# Patient Record
Sex: MALE | Race: BLACK OR AFRICAN AMERICAN
[De-identification: names, ages, dates, MRNs, and addresses within clinical notes are randomized per-mention and may not be internally consistent; named-entity substitution may affect disease eponyms.]

---

## 2019-08-12 ENCOUNTER — HOSPITAL ENCOUNTER (EMERGENCY)
Dept: HOSPITAL 10 - FTE | Age: 44
Discharge: HOME | End: 2019-08-12
Payer: COMMERCIAL

## 2019-08-12 ENCOUNTER — HOSPITAL ENCOUNTER (EMERGENCY)
Dept: HOSPITAL 91 - FTE | Age: 44
Discharge: HOME | End: 2019-08-12
Payer: COMMERCIAL

## 2019-08-12 VITALS
WEIGHT: 219.36 LBS | HEIGHT: 72 IN | HEIGHT: 72 IN | BODY MASS INDEX: 29.71 KG/M2 | WEIGHT: 219.36 LBS | BODY MASS INDEX: 29.71 KG/M2

## 2019-08-12 VITALS — DIASTOLIC BLOOD PRESSURE: 90 MMHG | RESPIRATION RATE: 16 BRPM | HEART RATE: 98 BPM | SYSTOLIC BLOOD PRESSURE: 145 MMHG

## 2019-08-12 DIAGNOSIS — E11.9: ICD-10-CM

## 2019-08-12 DIAGNOSIS — L02.31: Primary | ICD-10-CM

## 2019-08-12 DIAGNOSIS — F17.210: ICD-10-CM

## 2019-08-12 PROCEDURE — 99283 EMERGENCY DEPT VISIT LOW MDM: CPT

## 2019-08-12 RX ADMIN — CEPHALEXIN 1 MG: 500 CAPSULE ORAL at 03:43

## 2019-08-12 RX ADMIN — SULFAMETHOXAZOLE AND TRIMETHOPRIM 1 TAB: 800; 160 TABLET ORAL at 03:43

## 2019-08-12 NOTE — ERD
ER Documentation


Chief Complaint


Chief Complaint





ABSCESS ON BUTTOCK X2DAYS; HX OF DM





HPI


Patient is a 44-year-old male past medical history of DM type II, who presents 


the ER for concerns of an abscess to his buttocks.  Patient states for the last 


2 days he has noted drainage from his buttocks.  Patient states he noticed the 


drainage [place] the [place].  Patient denies any IV drug use.  Patient 


denies any fevers or chills.  Patient states he takes metformin for his diabetes


daily.  Patient stated his tetanus vaccination is up-to-date he got it 2 years 


ago.





ROS


All systems reviewed and are negative except as per history of present illness.





Medications


Home Meds


Active Scripts


Acetaminophen* (Tylophen*) 500 Mg Capsule, 1 CAP PO Q6H PRN for PAIN AND OR 


ELEVATED TEMP, #20 CAP


   Prov:DAKOTA GONZALEZ PA-C         8/12/19


Sulfamethoxazole/Trimethoprim* (Bactrim Ds* Tablet) 1 Each Tablet, 1 TAB PO BID,


#14 TAB


   Prov:DAKOTA GONZALEZ PA-C         8/12/19


Cephalexin* (Keflex*) 500 Mg Capsule, 500 MG PO TID for 7 Days, CAP


   Prov:DAKOTA GONZALEZ PA-C         8/12/19





Allergies


Allergies:  


Coded Allergies:  


     No Known Allergy (Unverified , 8/12/19)





PMhx/Soc


Medical and Surgical Hx:  pt denies Surgical Hx


Hx Miscellaneous Medical Probl:  Yes (DM)


Hx Alcohol Use:  No


Hx Substance Use:  No


Hx Tobacco Use:  Yes


Smoking Status:  Current every day smoker





FmHx


Family History:  diabetes





Physical Exam


Vitals





Vital Signs


  Date      Temp  Pulse  Resp  B/P (MAP)   Pulse Ox  O2          O2 Flow    FiO2


Time                                                 Delivery    Rate


   8/12/19  98.0     98    16      145/90        98


     03:09                          (108)





Physical Exam


GENERAL: Well-developed, well-nourished male. Appears in no acute distress. 


HEAD: Normocephalic, atraumatic. 


EYES: Pupils are equally reactive bilaterally. EOMs grossly intact. No 


conjunctival erythema. 


EXTREMITIES: Equal pulses bilaterally. No peripheral clubbing, cyanosis or 


edema. No unilateral leg swelling.


NEUROLOGIC: Alert and oriented. Moving all four extremities without any 


difficulty. Normal speech. Steady gait. 


BUTTOCKS: CHRISTINA Lynch present as well as ER conner Horn during this part of the 


exam.  4 cm x 4 cm circular indurated area noted in the inner left buttocks.  


Purulent drainage expressed from site with direct pressure.


Results 24 hrs





Current Medications


 Medications
   Dose
          Sig/Geo
       Start Time
   Status  Last


 (Trade)       Ordered        Route
 PRN     Stop Time              Admin
Dose


                              Reason                                Admin


                1 tab          ONCE  ONCE
    8/12/19                



Trimethoprim/                 PO
            03:30




                                            8/12/19 03:31


Sulfamethoxaz


ole



(Bactrim


(Ds))


 Cephalexin
    500 mg         ONCE  ONCE
    8/12/19                



(Keflex)                      PO
            03:30



                                             8/12/19 03:31








Procedures/MDM


MEDICAL DECISION MAKING:


Patient is a 44-year-old male history of DM type II, presents the ER for 


concerns of a left buttocks abscess x2 days.  Patient denies any fevers or 


chills.. Vital signs were reviewed. Patient is afebrile. Patient was not 


hypoxic.  On exam, patient is noted to have left buttocks abscess which is 


already draining.  Purulent drainage is noted from the affected site.  ED tech 


assisted with cleaning the affected area.  I offered the patient incision and 


drainage of abscess site today however he declined.  Patient states he only 


wants antibiotics.  Patient was given first dose of Keflex and Bactrim here.  


Patient was advised to return in 2 days for recheck or return sooner for any new


or worsening symptoms.  Patient advised to perform sitz bath at home.  Low 


suspicion for sepsis, deep space infection, DKA.  Patient was advised on the 


importance of DM medication compliance.  Patient was nontoxic, non-ill-appearing


prior to discharge.





PRESCRIPTION:


Tylenol, Bactrim, Keflex





DISCHARGE:


At this time, patient is stable for discharge and outpatient management. I have 


instructed the patient to follow-up with his/her primary care physician in 1-2 


days. I have discussed with the patient the possibility of needing to see a 


specialist for further workup and imaging studies if symptoms persist. I have 


instructed the patient to promptly return to the ER for any new or worsening 


symptoms including increased pain, fever, nausea, vomiting, weakness or LOC. The


patient and/or family expressed understanding of and agreement with this plan. 


All questions were answered. Home care instructions were provided. 





Patients blood pressure was elevated (>120/80) but appears stable without 


evidence of hypertensive emergency, hypertensive urgency or end-organ failure. I


had discussion with the patient about the risks of hypertension. I have advised 


the patient to follow up with his/her primary care physician for outpatient 


monitoring and treatment for hypertension in 2-3 days. I have instructed the 


patient to return to the ER for any new or worsening symptoms including chest 


pain, shortness of breath, headache, blurred vision, confusion, nausea, vomiting


or LOC. 








Disclaimer: Inadvertent spelling and grammatical errors are likely due to 


EHR/dictation software use and do not reflect on the overall quality of patient 


care. Also, please note that the electronic time recorded on this note does not 


necessarily reflect the actual time of the patient encounter.





Departure


Diagnosis:  


   Primary Impression:  


   Abscess


Condition:  Fair


Patient Instructions:  Abscess Drainage


Referrals:  


On license of [facility]


YOU HAVE RECEIVED A MEDICAL SCREENING EXAM AND THE RESULTS INDICATE THAT YOU DO 


NOT HAVE A CONDITION THAT REQUIRES URGENT TREATMENT IN THE EMERGENCY DEPARTMENT.





FURTHER EVALUATION AND TREATMENT OF YOUR CONDITION CAN WAIT UNTIL YOU ARE SEEN 


IN YOUR DOCTORS OFFICE WITHIN THE NEXT 1-2 DAYS. IT IS YOUR RESPONSIBILITY TO 


MAKE AN APPOINTMENT FOR FOLOW-UP CARE.





IF YOU HAVE A PRIMARY DOCTOR


--you should call your primary doctor and schedule an appointment





IF YOU DO NOT HAVE A PRIMARY DOCTOR YOU CAN CALL OUR PHYSICIAN REFERRAL HOTLINE 


AT


 [phone] 





IF YOU CAN NOT AFFORD TO SEE A PHYSICIAN YOU CAN CHOSE FROM THE FOLLOWING 


[facility] [phone] [address] [facility]. [facility] [phone] [address] [facility]. [facility] [phone] [address] [facility] [facility] [phone] [address] [facility] [facility] [phone] [address] [facility]. [facility]. [phone] [address] [facility]. [facility]


YOU HAVE RECEIVED A MEDICAL SCREENING EXAM AND THE RESULTS INDICATE THAT YOU DO 


NOT HAVE A CONDITION THAT REQUIRES URGENT TREATMENT IN THE EMERGENCY DEPARTMENT.





FURTHER EVALUATION AND TREATMENT OF YOUR CONDITION CAN WAIT UNTIL YOU ARE SEEN 


IN YOUR DOCTORS OFFICE WITHIN THE NEXT 1-2 DAYS. IT IS YOUR RESPONSIBILITY TO 


MAKE AN APPOINTMENT FOR FOLOW-UP CARE.





IF YOU HAVE A PRIMARY DOCTOR


--you should call your primary doctor and schedule and appointment





IF YOU DO NOT HAVE A PRIMARY DOCTOR YOU CAN CALL OUR PHYSICIAN REFERRAL HOTLINE 


AT [phone].





IF YOU CAN NOT AFFORD TO SEE A PHYSICIAN YOU CAN CHOSE FROM THE FOLLOWING [facility]


INSTITUTIONS:





[facility]


[address] [place] [address]





[facility]


[address] [place] [address]





[facility]


[address] [place] [address]





Additional Instructions:  


Sitz baths/ warm compresses advised as discussed. Wound recheck advised in 2 


days.  Return to the ER sooner for any new or worsening symptoms including but 


not limited to pain, fevers, chills or worsening abscess.





Take all your diabetes medications as prescribed. 





Call your primary care doctor TOMORROW for an appointment during the next 1-2 


days.See the doctor sooner or return here if your condition worsens before your 


appointment time.











DAKOTA GONZALEZ PA-C             Aug 12, 2019 03:43